# Patient Record
Sex: FEMALE | Race: WHITE | NOT HISPANIC OR LATINO | Employment: STUDENT
[De-identification: names, ages, dates, MRNs, and addresses within clinical notes are randomized per-mention and may not be internally consistent; named-entity substitution may affect disease eponyms.]

---
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
## 2020-03-25 ENCOUNTER — VIRTUAL VISIT (OUTPATIENT)
Dept: FAMILY MEDICINE | Facility: OTHER | Age: 20
End: 2020-03-25

## 2020-03-25 NOTE — PROGRESS NOTES
"Date: 2020 08:27:02  Clinician: Umu Whitaker  Clinician NPI: [id number]  Patient: Timoteo Mahmood  Patient : 2000  Patient Address: [address] [place] [address]  Patient Phone: [phone]  Visit Protocol: URI  Patient Summary:  Timoteo is a 19 year old ( : 2000 ) female who initiated a Visit for COVID-19 (Coronavirus) evaluation and screening. When asked the question \"Please sign me up to receive news, health information and promotions from [employer].\", Timoteo responded \"No\".    Timoteo states her symptoms started 1-2 days ago.   Her symptoms consist of enlarged lymph nodes, malaise, chills, rhinitis, a headache, facial pain or pressure, myalgia, a sore throat, a cough, and nasal congestion. Timoteo also feels feverish but was unable to measure her temperature.   Symptom details     Nasal secretions: The color of her mucus is clear.    Cough: Timoteo coughs a few times an hour and her cough is not more bothersome at night. Phlegm does not come into her throat when she coughs. She believes her cough is caused by post-nasal drip.     Sore throat: Timoteo reports having moderate throat pain (4-6 on a 10 point pain scale), does not have exudate on her tonsils, and can swallow liquids. The lymph nodes in her neck are enlarged. A rash has not appeared on the skin since the sore throat started.     Facial pain or pressure: The facial pain or pressure feels worse when bending over or leaning forward.     Headache: She states the headache is mild (1-3 on a 10 point pain scale).      Timoteo denies having ear pain, teeth pain, and wheezing. She also denies having a sinus infection within the past year, taking antibiotic medication for the symptoms, and having recent facial or sinus surgery in the past 60 days. She is not experiencing dyspnea.   Precipitating events  Within the past week, Timoteo has not been exposed to someone with strep throat. She has not recently been " exposed to someone with influenza. Timoteo has not been in close contact with any high risk individuals.   Pertinent COVID-19 (Coronavirus) information  Timoteo has not traveled internationally or to the areas where COVID-19 (Coronavirus) is widespread, including cruise ship travel in the last 14 days before the start of her symptoms.   Timoteo has not had a close contact with a laboratory-confirmed COVID-19 patient within 14 days of symptom onset. She also has not had a close contact with a suspected COVID-19 patient within 14 days of symptom onset.   Timoteo is not a healthcare worker or a  and does not work in a healthcare facility. She is not a family member of a healthcare worker and does not live with someone who is a healthcare worker.   Pertinent medical history  Timoteo does not get yeast infections when she takes antibiotics.   Timoteo does not need a return to work/school note.   Weight: 155 lbs   Timoteo does not smoke or use smokeless tobacco.   She denies pregnancy and denies breastfeeding. She has menstruated in the past month.   Weight: 155 lbs    MEDICATIONS: No current medications, ALLERGIES: NKDA  Clinician Response:  Dear Timoteo,   Based on the information you have provided, you do have symptoms that are consistent with Coronavirus (COVID-19).  The coronavirus causes mild to severe respiratory illness with the most common symptoms including fever, cough and difficulty breathing. Unfortunately, many viruses cause similar symptoms and it can be difficult to distinguish between viruses, especially in mild cases, so we are presuming that anyone with cough or fever has coronavirus at this time.  Coronavirus/COVID-19 has reached the point of community spread in [place], meaning that we are finding the virus in people with no known exposure risk for juan r the virus. Given the increasing commonness of coronavirus in the community we are no longer testing patients who are  not critically ill.  If you are a health care worker, you should refer to your employee health office for instructions about testing and returning to work.  For everyone else who has cough or fever, you should assume you are infected with coronavirus. Since you will not be tested but have symptoms that may be consistent with coronavirus, the CDC recommends you stay in self-isolation until these three things have happened:    You have had no fever for at least 72 hours (that is three full days of no fever without the use of medicine that reduces fevers)    AND   Other symptoms have improved (for example, when your cough or shortness of breath have improved)   AND   At least 7 days have passed since your symptoms first appeared.   How to Isolate:   Isolate yourself at home.  Do Not allow any visitors  Do Not go to work or school  Do Not go to [place],  centers, shopping, or other public places.  Do Not shake hands.  Avoid close contact with others (hugging, kissing).   Protect Others:   Cover Your Mouth and Nose with a mask, disposable tissue or wash cloth to avoid spreading germs to others.  Wash your hands and face frequently with soap and water.   We know it can be scary to hear that you might have COVID-19. Our team can help track your symptoms and make sure you are doing ok over the next two weeks using a program called [employer] to keep in touch. When you receive an email from [employer], please consider enrolling in our monitoring program. There is no cost to you for monitoring. Here is a URL where you can learn more: [email or web address]  Managing Symptoms:   At this time, we primarily recommend Tylenol (Acetaminophen) for fever or pain. If you have liver or kidney problems, contact your primary care provider for instructions on use of tylenol. Adults can take 650 mg (two 325 mg pills) by mouth every 4-6 hours as needed OR 1,000 mg (two 500 mg pills) every 8 hours as needed. MAXIMUM  DAILY DOSE: 3,000mg. For children, refer to dosing on bottle based on age or weight.   If you develop significant shortness of breath that prevents you from doing normal activities, please call 911 or proceed to the nearest emergency room and alert them immediately that you have been in self-isolation for possible coronavirus.  For more information about COVID19 and options for caring for yourself at home, please visit the CDC website at https://www.cdc.gov/coronavirus/2019-ncov/about/steps-when-sick.htmlFor more options for care at [facility], please visit our website at [email or web address]    Diagnosis: Cough  Diagnosis ICD: R05

## 2020-03-28 ENCOUNTER — VIRTUAL VISIT (OUTPATIENT)
Dept: FAMILY MEDICINE | Facility: OTHER | Age: 20
End: 2020-03-28

## 2020-03-28 NOTE — PROGRESS NOTES
"Date: 2020 11:53:35  Clinician: ZEYAD Lazo  Clinician NPI: [id number]  Patient: Timoteo Mahmood  Patient : 2000  Patient Address: [address] [place] [address]  Patient Phone: [phone]  Visit Protocol: URI  Patient Summary:  Timoteo is a 19 year old ( : 2000 ) female who initiated a Visit for COVID-19 (Coronavirus) evaluation and screening. When asked the question \"Please sign me up to receive news, health information and promotions from [employer].\", Timoteo responded \"No\".    Timoteo states her symptoms started gradually 3-6 days ago.   Her symptoms consist of a headache, myalgia, a cough, nasal congestion, and malaise. She is experiencing mild difficulty breathing with activities but can speak normally in full sentences.   Symptom details     Nasal secretions: The color of her mucus is white.    Cough: Timoteo coughs every 5-10 minutes and her cough is not more bothersome at night. Phlegm comes into her throat when she coughs. She does not believe her cough is caused by post-nasal drip. The color of the phlegm is white.     Headache: She states the headache is moderate (4-6 on a 10 point pain scale).      Timoteo denies having ear pain, rhinitis, teeth pain, fever, facial pain or pressure, chills, wheezing, and sore throat. She also denies taking antibiotic medication for the symptoms, having recent facial or sinus surgery in the past 60 days, and double sickening (worsening symptoms after initial improvement).   Precipitating events  She has not recently been exposed to someone with influenza. Timoteo has not been in close contact with any high risk individuals.   Pertinent COVID-19 (Coronavirus) information  Timoteo has not traveled internationally or to the areas where COVID-19 (Coronavirus) is widespread, including cruise ship travel in the last 14 days before the start of her symptoms.   Timoteo has not had a close contact with a laboratory-confirmed " COVID-19 patient within 14 days of symptom onset. She also has not had a close contact with a suspected COVID-19 patient within 14 days of symptom onset.   Timoteo is not a healthcare worker or a  and does not work in a healthcare facility. She does not live with a healthcare worker.   Pertinent medical history  Timoteo does not get yeast infections when she takes antibiotics.   Timoteo does not need a return to work/school note.   Weight: 160 lbs   Timoteo does not smoke or use smokeless tobacco.   She denies pregnancy and denies breastfeeding. She has menstruated in the past month.   Weight: 160 lbs    MEDICATIONS: No current medications, ALLERGIES: NKDA  Clinician Response:  Dear Timoteo,   Based on the information you have provided, you do have symptoms that are consistent with Coronavirus (COVID-19).  The coronavirus causes mild to severe respiratory illness with the most common symptoms including fever, cough and difficulty breathing. Unfortunately, many viruses cause similar symptoms and it can be difficult to distinguish between viruses, especially in mild cases, so we are presuming that anyone with cough or fever has coronavirus at this time.  Coronavirus/COVID-19 has reached the point of community spread in [place], meaning that we are finding the virus in people with no known exposure risk for juan r the virus. Given the increasing commonness of coronavirus in the community we are no longer testing patients who are not critically ill.  If you are a health care worker, you should refer to your employee health office for instructions about testing and returning to work.  For everyone else who has cough or fever, you should assume you are infected with coronavirus. Since you will not be tested but have symptoms that may be consistent with coronavirus, the CDC recommends you stay in self-isolation until these three things have happened:    You have had no fever for at least 72 hours  (that is three full days of no fever without the use of medicine that reduces fevers)    AND   Other symptoms have improved (for example, when your cough or shortness of breath have improved)   AND   At least 7 days have passed since your symptoms first appeared.   How to Isolate:   Isolate yourself at home.  Do Not allow any visitors  Do Not go to work or school  Do Not go to [employer],  centers, shopping, or other public places.  Do Not shake hands.  Avoid close contact with others (hugging, kissing).   Protect Others:   Cover Your Mouth and Nose with a mask, disposable tissue or wash cloth to avoid spreading germs to others.  Wash your hands and face frequently with soap and water.   We know it can be scary to hear that you might have COVID-19. Our team can help track your symptoms and make sure you are doing ok over the next two weeks using a program called [employer] to keep in touch. When you receive an email from [employer], please consider enrolling in our monitoring program. There is no cost to you for monitoring. Here is a URL where you can learn more: [email or web address]  Managing Symptoms:   At this time, we primarily recommend Tylenol (Acetaminophen) for fever or pain. If you have liver or kidney problems, contact your primary care provider for instructions on use of tylenol. Adults can take 650 mg (two 325 mg pills) by mouth every 4-6 hours as needed OR 1,000 mg (two 500 mg pills) every 8 hours as needed. MAXIMUM DAILY DOSE: 3,000mg. For children, refer to dosing on bottle based on age or weight.   If you develop significant shortness of breath that prevents you from doing normal activities, please call 911 or proceed to the nearest emergency room and alert them immediately that you have been in self-isolation for possible coronavirus.  If you have a higher risk medical condition such as cancer, heart failure, end stage renal disease on dialysis or have a transplant, please reach  out to your specialist's clinic to advise them of your [facility] visit should you not improve within the next two days.   For more information about COVID19 and options for caring for yourself at home, please visit the CDC website at https://www.cdc.gov/coronavirus/2019-ncov/about/steps-when-sick.htmlFor more options for care at [facility], please visit our website at [email or web address]    Diagnosis: Cough  Diagnosis ICD: R05  Prescription: acetaminophen (Tylenol Extra Strength) 500 mg oral tablet 60 tablet, 0 days supply. Take 2 tablets by mouth every 8 hours as needed for fever and pain. Refills: 0, Refill as needed: no, Allow substitutions: yes  Prescription: oxymetazoline (Afrin (oxymetazoline)) 0.05 % nasal spray,non-aerosol 1 15 ml squeeze bottle, 3 days supply. Inhale 2 sprays in each nostril intranasally 2 times per day for 3 days. Refills: 0, Refill as needed: no, Allow substitutions: yes  Prescription: benzonatate (Tessalon Perles) 100 mg oral capsule 45 capsule, 0 days supply. Take 1 capsule by mouth 3 times per day as needed for cough. Refills: 0, Refill as needed: no, Allow substitutions: yes  Pharmacy: [id number] - [phone] - [address] [place] [address]

## 2020-09-01 ENCOUNTER — COMMUNICATION - HEALTHEAST (OUTPATIENT)
Dept: SCHEDULING | Facility: CLINIC | Age: 20
End: 2020-09-01

## 2020-09-01 DIAGNOSIS — Z20.822 COVID-19 RULED OUT: ICD-10-CM

## 2020-09-04 ENCOUNTER — AMBULATORY - HEALTHEAST (OUTPATIENT)
Dept: LAB | Facility: CLINIC | Age: 20
End: 2020-09-04

## 2020-09-04 DIAGNOSIS — Z20.822 COVID-19 RULED OUT: ICD-10-CM

## 2020-09-09 ENCOUNTER — COMMUNICATION - HEALTHEAST (OUTPATIENT)
Dept: SCHEDULING | Facility: CLINIC | Age: 20
End: 2020-09-09

## 2021-01-04 ENCOUNTER — HEALTH MAINTENANCE LETTER (OUTPATIENT)
Age: 21
End: 2021-01-04

## 2021-05-31 VITALS — HEIGHT: 67 IN | BODY MASS INDEX: 24.8 KG/M2 | WEIGHT: 158 LBS

## 2021-06-01 VITALS — WEIGHT: 157.3 LBS | HEIGHT: 67 IN | BODY MASS INDEX: 24.69 KG/M2

## 2021-06-02 VITALS — WEIGHT: 167 LBS | HEIGHT: 67 IN | BODY MASS INDEX: 26.21 KG/M2

## 2021-06-11 NOTE — PROGRESS NOTES
[facility] Well Child Check    ASSESSMENT & PLAN  Prema Mahmood is a 16  y.o. 6  m.o. who has normal growth and normal development.  We have gone ahead and given  her Menactra today.  We still need to check a hemoglobin, as I cannot find a record that we have ever done that yet.  And we should get a random lipid profile, and we will do all this next year.    Diagnoses and all orders for this visit:    Well adolescent visit        Return to clinic in 1 year for a Well Child Check or sooner as needed    IMMUNIZATIONS/LABS  Immunizations were reviewed and orders were placed as appropriate. and I have discussed the risks and benefits of all of the vaccine components with the patient/parents.  All questions have been answered.    REFERRALS  Dental:  The patient has already established care with a dentist.  Other:  No additional referrals were made at this time.    ANTICIPATORY GUIDANCE  I have reviewed age appropriate anticipatory guidance.    HEALTH HISTORY  Do you have any concerns that you'd like to discuss today?: No concerns       Roomed by: musa    Accompanied by Mother    Refills needed? No    Do you have any forms that need to be filled out? Yes forms       Do you have any significant health concerns in your family history?: No  No family history on file.  Since your last visit, have there been any major changes in your family, such as a move, job change, separation, divorce, or death in the family?: No    Home  Who lives in your home?:    Social History     Social History Narrative    Mom- Carolina  Dad- Shane    younger sister-Halley      Do you have any trouble with sleep?:  No    Education  What school does your child attend?:  [employer]  What grade is your child in?:  11th  How does the patient perform in school (grades, behavior, attention, homework?: doing well     Eating  Does patient eat regular meals including fruits and vegetables?:  yes  What is the patient drinking (cow's milk, water,  "soda, juice, sports drinks, energy drinks, etc)?: cow's milk- 2% and water, not much milk  Does patient have concerns about body or appearance?:  No    Activities  Does the patient have friends?:  yes  Does the patient get at least one hour of physical activity per day?:  yes  Does the patient have less than 2 hours of screen time per day (aside from homework)?:  no, not much during school year more in the summer  What does your child do for exercise?:  Basketball, running  Does the patient have interest/participate in community activities/volunteers/school sports?:  yes    MENTAL HEALTH SCREENING  No Data Recorded  No Data Recorded    VISION/HEARING  Vision: Not done: Performed elsewhere: 2016 , contacts  Hearing:  Completed. See Results    No exam data present    TB Risk Assessment:  The patient and/or parent/guardian answer positive to:  patient and/or parent/guardian answer 'no' to all screening TB questions    Dental  Is your child being seen by a dentist?  Yes  Is child seen by dentist?     Yes    Patient Active Problem List   Diagnosis   (none) - all problems resolved or deleted       Drugs  Does the patient use tobacco/alcohol/drugs?:  no    Safety  Does the patient have any safety concerns (peer or home)?:  no  Does the patient use safety belts, helmets and other safety equipment?:  yes    Sex  Is the patient sexually active?:  no    MEASUREMENTS  Height:  5' 7.25\" (1.708 m)  Weight: 158 lb (71.7 kg)  BMI: Body mass index is 24.56 kg/(m^2).  Blood Pressure: 100/58  Blood pressure percentiles are 9 % systolic and 18 % diastolic based on NHBPEP's 4th Report. Blood pressure percentile targets: 90: 128/82, 95: 131/86, 99 + 5 mmH/98.    PHYSICAL EXAM  Constitutional: She appears well-developed and well-nourished.   HEENT: Head: Normocephalic.    Right Ear: Tympanic membrane, external ear and canal normal.    Left Ear: Tympanic membrane, external ear and canal normal.    Nose: Nose normal. "    Mouth/Throat: Mucous membranes are moist. Oropharynx is clear. Has braces   Eyes: Conjunctivae and lids are normal. Pupils are equal, round, and reactive to light.   Neck: Neck supple. No tenderness is present.   Cardiovascular: Normal rate and regular rhythm. No murmur heard.  Pulses: Femoral pulses are 2+ bilaterally.   Pulmonary/Chest: Effort normal and breath sounds normal. There is normal air entry. Breast development is normal.  Won stage 4  Abdominal: Soft. There is no hepatosplenomegaly. No inguinal hernia.   Musculoskeletal: Normal range of motion. Normal strength and tone. No abnormalities. Spine is straight. Normal duck walk.  Normal heel to toe walk.   : Declined  Neurological: She is alert. She has normal reflexes. Gait normal.   Psychiatric: She has a normal mood and affect. Her speech is normal and behavior is normal.  Skin: Clear. No rashes.

## 2021-06-11 NOTE — TELEPHONE ENCOUNTER
"Sick back in March, 2020    Body aches, tightness in chest.  No taste or smell  Fever 1 day.  Lasted 1 week.      Patient is calling requesting COVID serologic antibody testing.  NOTE: Serologic testing is a blood test for 'antibodies' which are made at 10-14 days after you have had symptoms of COVID or were exposed and had an asymptomatic infection.  This does NOT test you for 'active' infection or tell you if you are contagious.    Are you a healthcare worker?  No  Do you currently have a cough, fever, body aches, shortness of breath, or difficulty breathing?  No  Did you previously have cough, fever, body aches, shortness of breath, or difficulty breathing that have now resolved? Has had previous covid symptoms.   Symptoms began 165  days ago.  Symptoms started > 14 days ago. Lab order placed per SARS-CoV-2 Serology test Standing Order using indication \"Previously symptomatic >14d since onset, currently asymptomatic\" and diagnosis code \"Screening for viral disease\" (Z11.59)          The patient was informed: \"Testing is limited each day and it may take time for testing to be available to everyone who has called. You will receive a call within 48-72 hours to schedule the serology testing. Please confirm the best number to reach you is [phone]. If you have any questions about scheduling, call [phone].\"     [phone]        "

## 2021-06-16 NOTE — PROGRESS NOTES
"[facility] Pediatric Acute Visit     HPI:  Prema Mahmood is a 17 y.o.  female who presents to the clinic with mom.  Mom brings her in because she called her from school today stating that she had a worsening sore throat, fever, and bad headache.  The sore throat started yesterday.  She has no cough and no rhinitis.  She is not aware of any exposures to strep and no one at home is been ill.  She denies stomachache.  There is been no vomiting or diarrhea.        Past Med / Surg History:  No past medical history on file.  No past surgical history on file.    Fam / Soc History:  No family history on file.  Social History     Social History Narrative    Mom- Carolina Boudreaux- Shane    younger sister-Halley          ROS:  Gen: Positive for fever   Eyes: No eye discharge.   ENT: No nasal congestion or rhinorrhea.  Positive for pharyngitis. No otalgia.  Resp: No SOB, cough or wheezing.  GI:No diarrhea, nausea or vomiting  :No dysuria  MS: No joint/bone/muscle tenderness.  Skin: No rashes  Neuro: Positive for headaches  Lymph/Hematologic: No gland swelling      Objective:  Vitals: Pulse 76  Temp 98.6  F (37  C) (Oral)   Ht 5' 7.25\" (1.708 m)  Wt 157 lb 4.8 oz (71.4 kg)  BMI 24.45 kg/m2    Gen: Alert, well appearing  ENT: No nasal congestion or rhinorrhea. Oropharynx with mild erythema and no exudate, moist mucosa.  TMs normal bilaterally.  Eyes: Conjunctivae clear bilaterally.   Heart: Regular rate and rhythm; normal S1 and S2; no murmurs, gallops, or rubs.  Lungs: Unlabored respirations; clear breath sounds.  Musculoskeletal: Joints with full range-of-motion. Normal upper and lower extremities.  Skin: Normal without lesions.  Neuro: Oriented. Normal reflexes; normal tone; no focal deficits appreciated. Appropriate for age.  Hematologic/Lymph/Immune: No cervical lymphadenopathy  Psychiatric: Appropriate affect      Pertinent results   Reviewed     Assessment and Plan:    Prema Mahmood is a 17  y.o. 3  m.o. female " with:    1. Strep pharyngitis    - Rapid Strep A Screen-Throat  Results for orders placed or performed in visit on 03/21/18   Rapid Strep A Screen-Throat   Result Value Ref Range    Rapid Strep A Antigen Group A Strep detected (!) No Group A Strep detected, presumptive negative     We will start amoxicillin 500 mg p.o. twice daily for the next 10 days.  She should not attend school tomorrow.  If there is no improvement or worsening symptoms we should see her back in follow-up and they agree with that plan        Holly MARRERO  3/21/2018

## 2021-06-21 NOTE — PROGRESS NOTES
[facility] Well Child Check    ASSESSMENT & PLAN  Prema Mahmood is a 17  y.o. 10  m.o. who has normal growth and normal development.    Diagnoses and all orders for this visit:    Well adolescent visit  -     Hearing Screening  -     PHQ9 Depression Screen        Return to clinic in 1 year for a Well Child Check or sooner as needed    IMMUNIZATIONS/LABS  No immunizations due today.    REFERRALS  Dental:  The patient has already established care with a dentist.  Other:  No additional referrals were made at this time.    ANTICIPATORY GUIDANCE  I have reviewed age appropriate anticipatory guidance.    HEALTH HISTORY  Do you have any concerns that you'd like to discuss today?: No concerns       Roomed by: Livia    Refills needed? No    Do you have any forms that need to be filled out? Yes school form       Do you have any significant health concerns in your family history?: No  No family history on file.  Since your last visit, have there been any major changes in your family, such as a move, job change, separation, divorce, or death in the family?: No  Has a lack of transportation kept you from medical appointments?: No    Home  Who lives in your home?:    Social History     Social History Narrative    Mom- [place]  Dad- Shane    younger sister-Halley      Do you have any concerns about losing your housing?: No  Is your housing safe and comfortable?: Yes  Do you have any trouble with sleep?:  Yes: awake at night and don't have a great schedule    Education  What school do you child attend?:  [employer]  What grade are you in?:  12th  How do you perform in school (grades, behavior, attention, homework?: doing well     Eating  Do you eat regular meals including fruits and vegetables?:  yes  What are you drinking (cow's milk, water, soda, juice, sports drinks, energy drinks, etc)?: cow's milk- skim, water and OJ  Have you been worried that you don't have enough food?: No  Do you have concerns about your body or  "appearance?:  No    Activities  Do you have friends?:  yes  Do you get at least one hour of physical activity per day?:  yes  How many hours a day are you in front of a screen other than for schoolwork (computer, TV, phone)?:  1  What do you do for exercise?:  [employer], basketball  Do you have interest/participate in community activities/volunteers/school sports?:  yes, basketball    MENTAL HEALTH SCREENING  No Data Recorded  No Data Recorded    VISION/HEARING  Vision: Not done: Performed elsewhere: eye doctor june 2018, contacts  Hearing:  Completed. See Results     Hearing Screening    125Hz 250Hz 500Hz 1000Hz 2000Hz 3000Hz 4000Hz 6000Hz 8000Hz   Right ear:   25 20 20  20 20    Left ear:   25 20 20  20 20    Vision Screening Comments: Eye doctor 6/2018    TB Risk Assessment:  The patient and/or parent/guardian answer positive to:  patient and/or parent/guardian answer 'no' to all screening TB questions    Dyslipidemia Risk Screening  Have either of your parents or any of your grandparents had a stroke or heart attack before age 55?: No  Any parents with high cholesterol or currently taking medications to treat?: No     Dental  When was the last time you saw the dentist?: Less than 30 days ago.  Approx date (required): 11/5/2018   Parent/Guardian declines the fluoride varnish application today. Fluoride not applied today.    Patient Active Problem List   Diagnosis   (none) - all problems resolved or deleted       Drugs  Does the patient use tobacco/alcohol/drugs?:  no    Safety  Does the patient have any safety concerns (peer or home)?:  no  Does the patient use safety belts, helmets and other safety equipment?:  yes    Sex  Have you ever had sex?:  No    MEASUREMENTS  Height:  5' 7.25\" (1.708 m)  Weight: 167 lb (75.8 kg)  BMI: Body mass index is 25.96 kg/(m^2).  Blood Pressure: 100/60  Blood pressure percentiles are 8 % systolic and 20 % diastolic based on the August 2017 AAP Clinical Practice Guideline. Blood " pressure percentile targets: 90: 126/78, 95: 129/82, 95 + 12 mmH/94.    PHYSICAL EXAM  Constitutional: She appears well-developed and well-nourished.   HEENT: Head: Normocephalic.    Right Ear: Tympanic membrane, external ear and canal normal.    Left Ear: Tympanic membrane, external ear and canal normal.    Nose: Nose normal.    Mouth/Throat: Mucous membranes are moist. Oropharynx is clear.    Eyes: Conjunctivae and lids are normal. Pupils are equal, round, and reactive to light. Optic discs are sharp.   Neck: Neck supple. No tenderness is present.   Cardiovascular: Normal rate and regular rhythm. No murmur heard.  Pulses: Femoral pulses are 2+ bilaterally.   Pulmonary/Chest: Effort normal and breath sounds normal. There is normal air entry. Breast development is normal.  Won stage 4.   Abdominal: Soft. There is no hepatosplenomegaly. No inguinal hernia.   Musculoskeletal: Normal range of motion. Normal strength and tone. No abnormalities. Spine is straight. Normal duck walk.  Normal heel to toe walk.   : Normal external female genitalia.  Won stage 4  Neurological: She is alert. She has normal reflexes. Gait normal.   Psychiatric: She has a normal mood and affect. Her speech is normal and behavior is normal.  Skin: Clear. No rashes.

## 2021-09-02 ENCOUNTER — IMMUNIZATION (OUTPATIENT)
Dept: NURSING | Facility: CLINIC | Age: 21
End: 2021-09-02
Payer: COMMERCIAL

## 2021-09-02 PROCEDURE — 91300 PR COVID VAC PFIZER DIL RECON 30 MCG/0.3 ML IM: CPT

## 2021-09-02 PROCEDURE — 0001A PR COVID VAC PFIZER DIL RECON 30 MCG/0.3 ML IM: CPT

## 2021-10-10 ENCOUNTER — HEALTH MAINTENANCE LETTER (OUTPATIENT)
Age: 21
End: 2021-10-10

## 2022-01-18 ENCOUNTER — OFFICE VISIT (OUTPATIENT)
Dept: PEDIATRICS | Facility: CLINIC | Age: 22
End: 2022-01-18
Payer: COMMERCIAL

## 2022-01-18 VITALS
BODY MASS INDEX: 23.79 KG/M2 | HEIGHT: 68 IN | SYSTOLIC BLOOD PRESSURE: 124 MMHG | DIASTOLIC BLOOD PRESSURE: 64 MMHG | WEIGHT: 157 LBS

## 2022-01-18 DIAGNOSIS — N92.0 SPOTTING: Primary | ICD-10-CM

## 2022-01-18 LAB — HCG UR QL: NEGATIVE

## 2022-01-18 PROCEDURE — 99203 OFFICE O/P NEW LOW 30 MIN: CPT | Performed by: PEDIATRICS

## 2022-01-18 PROCEDURE — 87591 N.GONORRHOEAE DNA AMP PROB: CPT | Performed by: PEDIATRICS

## 2022-01-18 PROCEDURE — 81025 URINE PREGNANCY TEST: CPT | Performed by: PEDIATRICS

## 2022-01-18 PROCEDURE — 87491 CHLMYD TRACH DNA AMP PROBE: CPT | Performed by: PEDIATRICS

## 2022-01-18 ASSESSMENT — MIFFLIN-ST. JEOR: SCORE: 1517.71

## 2022-01-19 LAB
C TRACH DNA SPEC QL PROBE+SIG AMP: NEGATIVE
N GONORRHOEA DNA SPEC QL NAA+PROBE: NEGATIVE

## 2022-01-19 NOTE — RESULT ENCOUNTER NOTE
Kobe Kunz,  Your urine tests came back normal/negative, which is good. Please let me know if your symptoms haven't improved or resolved by next week, sooner if they're getting worse.  Sincerely,  Eileen King

## 2022-01-20 NOTE — PROGRESS NOTES
"  Prema presents for: vaginal discharge      Assessment/Plan:  Spotting - suspect physiologic from ovulation, consider ovarian cyst. Denies history of vaginal intercourse ever, so unlikely, but will screen for urine pregnancy and GC/Chlamydia.   - HCG qualitative urine; Future  - Chlamydia & Gonorrhea by PCR, [facility] - Clinic Collect  - HCG qualitative urine      History of Present Illness: Prema Mahmood is a 21 year old female who is here today to discuss about a week of brown discharge along with left pelvis discomfort, cramping. She tracks her cycles closely and feels confident this has happened during the time she's due to ovulate. Her breasts have been tender.Her LMP was 12/29/21, and her cycles are regular. Her cycles can be heavy. She's occasionally had spotting with her cycles, but nothing like this. Discharge isn't malodorous or voluminous. No pruritis or sores. No fever or appetite concerns. No known exposure to STI, no history of vaginal intercourse ever. She had COVID at the beginning of this month.      A complete ROS, other than the HPI, was reviewed and was negative.     Allergies:  No Known Allergies    Medications:  No current outpatient medications on file.     No current facility-administered medications for this visit.       Past Medical History:  There is no problem list on file for this patient.    No past surgical history on file.    Examination:    Vitals:    01/18/22 1118   BP: 124/64   BP Location: Left arm   Patient Position: Sitting   Cuff Size: Adult Regular   Weight: 157 lb (71.2 kg)   Height: 5' 7.5\" (1.715 m)     GEN: alert, well appearing  EYES: clear  NOSE: clear  OROPHARYNX: clear  NECK: supple, no significant LAD  CVS: RRR, no murmur  LUNGS: clear, no increased work of breathing  ABD: soft, non-tender, non-distended  : declined  SKIN: clear    Data:  Results for orders placed or performed in visit on 01/18/22   HCG qualitative urine     Status: Normal   Result Value Ref " [facility]    hCG Urine Qualitative Negative Negative   Chlamydia & Gonorrhea by PCR, [facility] - Clinic Collect     Status: Normal    Specimen: Urine, Voided   Result Value Ref [facility]    Chlamydia Trachomatis Negative Negative    Neisseria gonorrhoeae Negative Negative       Leeanna King MD 1/19/2022 10:35 PM  Pediatrician  [facility] [phone]

## 2022-01-30 ENCOUNTER — HEALTH MAINTENANCE LETTER (OUTPATIENT)
Age: 22
End: 2022-01-30

## 2022-04-07 ENCOUNTER — MYC MEDICAL ADVICE (OUTPATIENT)
Dept: FAMILY MEDICINE | Facility: CLINIC | Age: 22
End: 2022-04-07
Payer: COMMERCIAL

## 2022-09-24 ENCOUNTER — HEALTH MAINTENANCE LETTER (OUTPATIENT)
Age: 22
End: 2022-09-24

## 2023-05-08 ENCOUNTER — HEALTH MAINTENANCE LETTER (OUTPATIENT)
Age: 23
End: 2023-05-08

## 2024-07-14 ENCOUNTER — HEALTH MAINTENANCE LETTER (OUTPATIENT)
Age: 24
End: 2024-07-14